# Patient Record
Sex: MALE | Race: BLACK OR AFRICAN AMERICAN | Employment: OTHER | URBAN - METROPOLITAN AREA
[De-identification: names, ages, dates, MRNs, and addresses within clinical notes are randomized per-mention and may not be internally consistent; named-entity substitution may affect disease eponyms.]

---

## 2018-01-30 ENCOUNTER — HOSPITAL ENCOUNTER (EMERGENCY)
Age: 73
Discharge: HOME | End: 2018-01-30
Attending: EMERGENCY MEDICINE | Admitting: EMERGENCY MEDICINE
Payer: MEDICARE

## 2018-01-30 VITALS
RESPIRATION RATE: 18 BRPM | OXYGEN SATURATION: 97 % | SYSTOLIC BLOOD PRESSURE: 142 MMHG | DIASTOLIC BLOOD PRESSURE: 85 MMHG | TEMPERATURE: 98.6 F | HEART RATE: 86 BPM

## 2018-01-30 DIAGNOSIS — Z00.00 WELL ADULT HEALTH CHECK: Primary | ICD-10-CM

## 2018-01-30 LAB
ATRIAL RATE: 80 BPM
CALCULATED P AXIS, ECG09: 23 DEGREES
CALCULATED R AXIS, ECG10: -19 DEGREES
CALCULATED T AXIS, ECG11: 24 DEGREES
DIAGNOSIS, 93000: NORMAL
P-R INTERVAL, ECG05: 142 MS
Q-T INTERVAL, ECG07: 366 MS
QRS DURATION, ECG06: 74 MS
QTC CALCULATION (BEZET), ECG08: 422 MS
VENTRICULAR RATE, ECG03: 80 BPM

## 2018-01-30 PROCEDURE — 93005 ELECTROCARDIOGRAM TRACING: CPT

## 2018-01-30 PROCEDURE — 99284 EMERGENCY DEPT VISIT MOD MDM: CPT
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
## 2018-01-30 NOTE — ED PROVIDER NOTES
EMERGENCY DEPARTMENT HISTORY AND PHYSICAL EXAM    9:29 AM      Date: 1/30/2018  Patient Name: Henry Grover    History of Presenting Illness     Chief Complaint   Patient presents with    Dizziness         History Provided By: Patient    Chief Complaint: \"I need to get this paper filled out\"  Duration:  N/A  Timing:  N/A  Location: N/A  Quality: N/A  Severity: N/A  Modifying Factors: none  Associated Symptoms: \"I sometimes feel dizzy, not now\"      Additional History (Context): Henry Grover is a 67 y.o. male with diabetes who presents to have paperwork from [facility] filled out. Per patient, was in [address] [place] [address] recently and was sent paperwork from [facility] which needs to be filled out by a doctor so that the patient's license is not restricted. He does NOT have any complaints currently. He notes \"I feel dizzy sometimes\", but not now, denies syncope; also notes right leg feels sort of weak/shaky only when he walks more than normal, which has been going on for months. Today denies abd pain, NVD, vision changes, dizziness, fatigue, syncope, CP, palpitations, fever. PCP: Poncho Tong MD        Past History     Past Medical History:  Past Medical History:   Diagnosis Date    Diabetes [facility])        Past Surgical History:  No past surgical history on file. Family History:  No family history on file. Social History:  Social History   Substance Use Topics    Smoking status: Not on file    Smokeless tobacco: Not on file    Alcohol use Not on file       Allergies:  No Known Allergies      Review of Systems     Review of Systems   Constitutional: Negative for activity change, appetite change, fatigue and fever. Eyes: Negative for visual disturbance. Respiratory: Negative for cough and shortness of breath. Cardiovascular: Negative for chest pain. Musculoskeletal: Negative for gait problem.    Neurological: Negative for dizziness, tremors, seizures, syncope, facial asymmetry, speech difficulty, weakness, light-headedness, numbness and headaches. All other systems reviewed and are negative. Physical Exam     Visit Vitals    /85    Pulse 86    Temp 98.6 °F (37 °C)    Resp 18    SpO2 97%       Physical Exam   Constitutional: He is oriented to person, place, and time. Vital signs are normal. He appears well-developed and well-nourished. He is cooperative. Non-toxic appearance. He does not have a sickly appearance. He does not appear ill. No distress. Elderly AAM, sitting upright in room, NAD   Eyes: Conjunctivae and lids are normal. No scleral icterus. Cardiovascular: Normal rate, regular rhythm and normal heart sounds. Pulmonary/Chest: Effort normal and breath sounds normal.   Neurological: He is alert and oriented to person, place, and time. He has normal strength. He displays no atrophy. No sensory deficit. He exhibits normal muscle tone. Gait normal.   A&O x 3  Grossly symmetrical movements, SILT BUE/BLE  Strength BUE/BLE 5/5 and symmetrical  Patellar reflexes 2+ bilat  Ambulating normally around department with cane   Skin: Skin is warm and dry. Nursing note and vitals reviewed. Diagnostic Study Results     Labs -  Recent Results (from the past 12 hour(s))   EKG, 12 LEAD, INITIAL    Collection Time: 01/30/18 10:07 AM   Result Value Ref Range    Ventricular Rate 80 BPM    Atrial Rate 80 BPM    P-R Interval 142 ms    QRS Duration 74 ms    Q-T Interval 366 ms    QTC Calculation (Bezet) 422 ms    Calculated P Axis 23 degrees    Calculated R Axis -19 degrees    Calculated T Axis 24 degrees    Diagnosis       Normal sinus rhythm  Possible Left atrial enlargement  Left ventricular hypertrophy  Nonspecific ST abnormality  Abnormal ECG  No previous ECGs available         Radiologic Studies -   No orders to display         Medical Decision Making   I am the first provider for this patient.     I reviewed the vital signs, available nursing notes, past medical history, past surgical history, family history and social history. Vital Signs-Reviewed the patient's vital signs. Pulse Oximetry Analysis -  98% on RA    EKG: Interpreted by the EP. Time Interpreted: 1009   Rate: 80 bpm   Rhythm: Normal Sinus Rhythm   Interpretation:  Normal sinus rhythm   Possible Left atrial enlargement   Left ventricular hypertrophy   Nonspecific ST abnormality   Abnormal ECG    Comparison: none available    Records Reviewed: Nursing Notes and Old Medical Records (Time of Review: 9:29 AM)    ED Course: Progress Notes, Reevaluation, and Consults: At reeval, pt continues to look well, no complaints. Provider Notes (Medical Decision Making): Pranav Suarez with noted pmhx presenting to have paperwork filled out for unrestricted 's license. Pt without acute complaints here. VSS, EKG without acute abnormalities. Exam unremarkable, including neuro eval.  Visual acuity 20/50 bilat. Pt encouraged to see PCP for full eval and to have papers filled out for unrestricted license. Pt voice understanding and agreement with plan. Diagnosis     Clinical Impression:   1.  Well adult health check      _______________________________

## 2018-02-24 ENCOUNTER — HOSPITAL ENCOUNTER (EMERGENCY)
Age: 73
Discharge: HOME | End: 2018-02-24
Attending: EMERGENCY MEDICINE
Payer: MEDICARE

## 2018-02-24 ENCOUNTER — APPOINTMENT (OUTPATIENT)
Dept: CT IMAGING | Age: 73
End: 2018-02-24
Attending: EMERGENCY MEDICINE
Payer: MEDICARE

## 2018-02-24 VITALS
SYSTOLIC BLOOD PRESSURE: 139 MMHG | RESPIRATION RATE: 16 BRPM | HEART RATE: 88 BPM | OXYGEN SATURATION: 97 % | TEMPERATURE: 97.5 F | DIASTOLIC BLOOD PRESSURE: 88 MMHG

## 2018-02-24 DIAGNOSIS — F03.90 DEMENTIA WITHOUT BEHAVIORAL DISTURBANCE, UNSPECIFIED DEMENTIA TYPE: Primary | ICD-10-CM

## 2018-02-24 DIAGNOSIS — G93.89 FRONTAL MASS OF BRAIN: ICD-10-CM

## 2018-02-24 LAB
ALBUMIN SERPL-MCNC: 3.6 G/DL (ref 3.4–5)
ALBUMIN/GLOB SERPL: 0.9 {RATIO} (ref 0.8–1.7)
ALP SERPL-CCNC: 92 U/L (ref 45–117)
ALT SERPL-CCNC: 18 U/L (ref 16–61)
AMMONIA PLAS-SCNC: 26 UMOL/L (ref 11–32)
ANION GAP SERPL CALC-SCNC: 8 MMOL/L (ref 3–18)
APPEARANCE UR: CLEAR
AST SERPL-CCNC: 12 U/L (ref 15–37)
BACTERIA URNS QL MICRO: NEGATIVE /HPF
BILIRUB SERPL-MCNC: 0.8 MG/DL (ref 0.2–1)
BILIRUB UR QL: NEGATIVE
BUN SERPL-MCNC: 9 MG/DL (ref 7–18)
BUN/CREAT SERPL: 10 (ref 12–20)
CALCIUM SERPL-MCNC: 8.6 MG/DL (ref 8.5–10.1)
CHLORIDE SERPL-SCNC: 104 MMOL/L (ref 100–108)
CK MB CFR SERPL CALC: 1.1 % (ref 0–4)
CK MB SERPL-MCNC: 2.1 NG/ML (ref 5–25)
CK SERPL-CCNC: 194 U/L (ref 39–308)
CO2 SERPL-SCNC: 29 MMOL/L (ref 21–32)
COLOR UR: ABNORMAL
CREAT SERPL-MCNC: 0.89 MG/DL (ref 0.6–1.3)
EPITH CASTS URNS QL MICRO: ABNORMAL /LPF (ref 0–5)
ERYTHROCYTE [DISTWIDTH] IN BLOOD BY AUTOMATED COUNT: 12.8 % (ref 11.6–14.5)
GLOBULIN SER CALC-MCNC: 4.1 G/DL (ref 2–4)
GLUCOSE SERPL-MCNC: 187 MG/DL (ref 74–99)
GLUCOSE UR STRIP.AUTO-MCNC: >1000 MG/DL
HCT VFR BLD AUTO: 43.4 % (ref 36–48)
HGB BLD-MCNC: 14.4 G/DL (ref 13–16)
HGB UR QL STRIP: NEGATIVE
KETONES UR QL STRIP.AUTO: ABNORMAL MG/DL
LEUKOCYTE ESTERASE UR QL STRIP.AUTO: NEGATIVE
MCH RBC QN AUTO: 31.9 PG (ref 24–34)
MCHC RBC AUTO-ENTMCNC: 33.2 G/DL (ref 31–37)
MCV RBC AUTO: 96.2 FL (ref 74–97)
MUCOUS THREADS URNS QL MICRO: ABNORMAL /LPF
NITRITE UR QL STRIP.AUTO: NEGATIVE
PH UR STRIP: 5 [PH] (ref 5–8)
PLATELET # BLD AUTO: 161 K/UL (ref 135–420)
PMV BLD AUTO: 11.9 FL (ref 9.2–11.8)
POTASSIUM SERPL-SCNC: 3.7 MMOL/L (ref 3.5–5.5)
PROT SERPL-MCNC: 7.7 G/DL (ref 6.4–8.2)
PROT UR STRIP-MCNC: 30 MG/DL
RBC # BLD AUTO: 4.51 M/UL (ref 4.7–5.5)
RBC #/AREA URNS HPF: NEGATIVE /HPF (ref 0–5)
SODIUM SERPL-SCNC: 141 MMOL/L (ref 136–145)
SP GR UR REFRACTOMETRY: >1.03 (ref 1–1.03)
TROPONIN I SERPL-MCNC: <0.02 NG/ML (ref 0–0.04)
UROBILINOGEN UR QL STRIP.AUTO: 1 EU/DL (ref 0.2–1)
WBC # BLD AUTO: 8.6 K/UL (ref 4.6–13.2)
WBC URNS QL MICRO: NEGATIVE /HPF (ref 0–4)

## 2018-02-24 PROCEDURE — 99283 EMERGENCY DEPT VISIT LOW MDM: CPT

## 2018-02-24 PROCEDURE — 82140 ASSAY OF AMMONIA: CPT | Performed by: EMERGENCY MEDICINE

## 2018-02-24 PROCEDURE — 81001 URINALYSIS AUTO W/SCOPE: CPT | Performed by: EMERGENCY MEDICINE

## 2018-02-24 PROCEDURE — 80053 COMPREHEN METABOLIC PANEL: CPT | Performed by: EMERGENCY MEDICINE

## 2018-02-24 PROCEDURE — 70450 CT HEAD/BRAIN W/O DYE: CPT

## 2018-02-24 PROCEDURE — 85027 COMPLETE CBC AUTOMATED: CPT | Performed by: EMERGENCY MEDICINE

## 2018-02-24 PROCEDURE — 82550 ASSAY OF CK (CPK): CPT | Performed by: EMERGENCY MEDICINE

## 2018-02-24 PROCEDURE — 74011000250 HC RX REV CODE- 250: Performed by: EMERGENCY MEDICINE

## 2018-02-24 PROCEDURE — 96365 THER/PROPH/DIAG IV INF INIT: CPT

## 2018-02-24 PROCEDURE — 74011250636 HC RX REV CODE- 250/636: Performed by: EMERGENCY MEDICINE

## 2018-02-24 PROCEDURE — 96366 THER/PROPH/DIAG IV INF ADDON: CPT

## 2018-02-24 RX ORDER — ATORVASTATIN CALCIUM 10 MG/1
TABLET, FILM COATED ORAL DAILY
COMMUNITY
End: 2019-10-02 | Stop reason: SDUPTHER

## 2018-02-24 RX ORDER — METFORMIN HYDROCHLORIDE 1000 MG/1
1000 TABLET ORAL 2 TIMES DAILY WITH MEALS
COMMUNITY
End: 2019-05-08 | Stop reason: DRUGHIGH

## 2018-02-24 RX ADMIN — FOLIC ACID: 5 INJECTION, SOLUTION INTRAMUSCULAR; INTRAVENOUS; SUBCUTANEOUS at 14:52

## 2018-02-24 NOTE — ED NOTES
In room to see patient, daughter at bedside, asked patient what he is hear for and daughter answers, \"He is here for his dementia, and we just need to talk to the doctor\"
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
## 2018-02-24 NOTE — DISCHARGE INSTRUCTIONS
Dementia: Care Instructions  Your Care Instructions    Dementia is a loss of mental skills that affects your daily life. It is different than the occasional trouble with memory that is part of aging. You may find it hard to remember things that you feel you should be able to remember. Or you may feel that your mind is just not working as well as usual.  Finding out that you have dementia is a shock. You may be afraid and worried about how the condition will change your life. Although there is no cure at this time, medicine may slow memory loss and improve thinking for a while. Other medicines may be able to help you sleep or cope with depression and behavior changes. Dementia often gets worse slowly. But it can get worse quickly. As dementia gets worse, it may become harder to do common things that take planning, like making a list and going shopping. Over time, the disease may make it hard for you to take care of yourself. Some people with dementia need others to help care for them. Dementia is different for everyone. You may be able to function well for a long time. In the early stage of the condition, you can do things at home to make life easier and safer. You also can keep doing your hobbies and other activities. Many people find comfort in planning now for their future needs. Follow-up care is a key part of your treatment and safety. Be sure to make and go to all appointments, and call your doctor if you are having problems. It's also a good idea to know your test results and keep a list of the medicines you take. How can you care for yourself at home? · Take your medicines exactly as prescribed. Call your doctor if you think you are having a problem with your medicine. · Eat healthy foods. Eat lots of whole grains, fruits, and vegetables every day.  If you are not hungry, try snacks or nutritional drinks such as Boost, Ensure, or Sustacal.  · If you have problems sleeping:  ¨ Try not to nap too close to your bedtime. ¨ Exercise regularly. Walking is a good choice. ¨ Try a glass of warm milk or caffeine-free herbal tea before bed. · Do tasks and activities during the time of day when you feel your best. It may help to develop a daily routine. · Post labels, lists, and sticky notes to help you remember things. Write your activities on a calendar you can easily find. Put your clock where you can easily see it. · Stay active. Take walks in familiar places, or with friends or loved ones. Try to stay active mentally too. Read and work crossword puzzles if you enjoy these activities. · Do not drive unless you can pass an on-road driving test. If you are not sure if you are safe to drive, your state 's license bureau can test you. · Keep a cordless phone and a flashlight with new batteries by your bed. If possible, put a phone in each of the main rooms of your house, or carry a cell phone in case you fall and cannot reach a phone. Or, you can wear a device around your neck or wrist. You push a button that sends a signal for help. Acknowledge your emotions and plan for the future  · Talk openly and honestly with your doctor. · Let yourself grieve. It is common to feel angry, scared, frustrated, anxious, or depressed. · Get emotional support from family, friends, a support group, or a counselor experienced in working with people who have dementia. · Ask for help if you need it. · Plan for the future. ¨ Talk to your family and doctor about preparing a living will and other important papers while you can make decisions. These papers tell your doctors how to care for you at the end of your life. ¨ Consider naming a person to make decisions about your care if you are not able to. When should you call for help? Call 911 anytime you think you may need emergency care. For example, call if:  ? · You are lost and do not know whom to call. ? · You are injured and do not know whom to call.    ?Call your doctor now or seek immediate medical care if:  ? · You are more confused or upset than usual.   ? · You feel like you could hurt yourself because your mind is not working well. ? Watch closely for changes in your health, and be sure to contact your doctor if you have any problems. Where can you learn more? Go to http://patricio-sonali.info/. Enter Z580 in the search box to learn more about \"Dementia: Care Instructions. \"  Current as of: May 12, 2017  Content Version: 11.4  © 1814-3799 [employer]. Care instructions adapted under license by [employer] (which disclaims liability or warranty for this information). If you have questions about a medical condition or this instruction, always ask your healthcare professional. [address] any warranty or liability for your use of this information.

## 2019-03-26 ENCOUNTER — OFFICE VISIT (OUTPATIENT)
Dept: FAMILY MEDICINE CLINIC | Age: 74
End: 2019-03-26

## 2019-03-26 VITALS
HEART RATE: 74 BPM | RESPIRATION RATE: 16 BRPM | DIASTOLIC BLOOD PRESSURE: 78 MMHG | WEIGHT: 231 LBS | TEMPERATURE: 97.5 F | OXYGEN SATURATION: 98 % | HEIGHT: 73 IN | SYSTOLIC BLOOD PRESSURE: 140 MMHG | BODY MASS INDEX: 30.62 KG/M2

## 2019-03-26 DIAGNOSIS — N39.41 URGE INCONTINENCE OF URINE: ICD-10-CM

## 2019-03-26 DIAGNOSIS — E11.9 CONTROLLED TYPE 2 DIABETES MELLITUS WITHOUT COMPLICATION, WITHOUT LONG-TERM CURRENT USE OF INSULIN (HCC): ICD-10-CM

## 2019-03-26 DIAGNOSIS — G20 PARKINSON'S DISEASE (HCC): ICD-10-CM

## 2019-03-26 DIAGNOSIS — M25.50 ARTHRALGIA, UNSPECIFIED JOINT: ICD-10-CM

## 2019-03-26 DIAGNOSIS — I10 ESSENTIAL HYPERTENSION: Primary | ICD-10-CM

## 2019-03-26 DIAGNOSIS — R01.1 HEART MURMUR: ICD-10-CM

## 2019-03-26 DIAGNOSIS — Z12.5 SCREENING PSA (PROSTATE SPECIFIC ANTIGEN): ICD-10-CM

## 2019-03-26 DIAGNOSIS — R05.9 COUGH: ICD-10-CM

## 2019-03-26 RX ORDER — INSULIN PUMP SYRINGE, 3 ML
EACH MISCELLANEOUS
Qty: 1 KIT | Refills: 0 | Status: SHIPPED | OUTPATIENT
Start: 2019-03-26 | End: 2019-10-23 | Stop reason: SDUPTHER

## 2019-03-26 RX ORDER — HYDROCHLOROTHIAZIDE 12.5 MG/1
12.5 TABLET ORAL DAILY
COMMUNITY
End: 2019-10-02 | Stop reason: ALTCHOICE

## 2019-03-26 RX ORDER — LISINOPRIL 40 MG/1
40 TABLET ORAL DAILY
COMMUNITY
End: 2019-04-02 | Stop reason: SDUPTHER

## 2019-03-26 RX ORDER — ALBUTEROL SULFATE 90 UG/1
2 AEROSOL, METERED RESPIRATORY (INHALATION)
Qty: 1 INHALER | Refills: 1 | Status: SHIPPED | OUTPATIENT
Start: 2019-03-26 | End: 2019-04-10 | Stop reason: SDUPTHER

## 2019-03-26 RX ORDER — METFORMIN HYDROCHLORIDE 1000 MG/1
1000 TABLET ORAL 2 TIMES DAILY WITH MEALS
COMMUNITY
End: 2019-05-08 | Stop reason: DRUGHIGH

## 2019-03-26 RX ORDER — ROPINIROLE 0.25 MG/1
TABLET, FILM COATED ORAL 3 TIMES DAILY
COMMUNITY
End: 2019-04-23 | Stop reason: SDUPTHER

## 2019-03-26 RX ORDER — ASPIRIN 81 MG/1
TABLET ORAL DAILY
COMMUNITY
End: 2019-06-28 | Stop reason: SDUPTHER

## 2019-03-26 RX ORDER — ACETAMINOPHEN 500 MG
1 TABLET ORAL DAILY
Qty: 1 KIT | Refills: 0 | Status: SHIPPED | OUTPATIENT
Start: 2019-03-26 | End: 2020-02-23

## 2019-03-26 RX ORDER — GABAPENTIN 100 MG/1
CAPSULE ORAL 3 TIMES DAILY
COMMUNITY
End: 2019-04-02 | Stop reason: SDUPTHER

## 2019-03-26 RX ORDER — LANCETS
EACH MISCELLANEOUS
Qty: 100 EACH | Refills: 1 | Status: SHIPPED | OUTPATIENT
Start: 2019-03-26 | End: 2020-02-23

## 2019-03-26 NOTE — PROGRESS NOTES
ESTABLISH CARE VISIT SUBJECTIVE:  
 
Chief Complaint Patient presents with [address] [place] [address]  Hypertension HPI: 75 y.o. male  has a past medical history of Diabetes ([facility] [address] Hypertension, and Parkinson's disease ([facility] [address] (2018). is here for the above chief complaint(s). Patient here today with caregiver. Parkinson's Patient has neurologist. Currently taking: 
Key Neurological Meds   
    
  
 rOPINIRole (REQUIP) 0.25 mg tablet (Taking) Take  by mouth three (3) times daily. gabapentin (NEURONTIN) 100 mg capsule (Taking) Take  by mouth three (3) times daily. aspirin delayed-release 81 mg tablet (Taking) Take  by mouth daily. Diabetes Key Antihyperglycemic Medications   
    
  
 metFORMIN (GLUCOPHAGE) 1,000 mg tablet (Taking) Take 1,000 mg by mouth two (2) times daily (with meals). Patient check glucose occasionally. His meter is not working well. Usually getting fasting around 100-120 every morning. Not monitoring post-prandial at this time. Patient denies frequent urination, excessive thirst, hypoglycemic events (lightheadedness/dizziness), nausea, vomiting, shakiness. Hypertension Patient not checking bp at home at this time. BP controlled (for patient's age of 75) today @ BP Readings from Last 3 Encounters:  
03/26/19 140/78 Patient taking Key CAD CHF Meds   
    
  
 lisinopril (PRINIVIL, ZESTRIL) 40 mg tablet (Taking) Take 40 mg by mouth daily. aspirin delayed-release 81 mg tablet (Taking) Take  by mouth daily. hydroCHLOROthiazide (HYDRODIURIL) 12.5 mg tablet (Taking) Take 12.5 mg by mouth daily. Patient denies chest pain, shortness of breath, palpitations, lower extremity edema, dizziness/lightheadedness or syncopal episodes. Cough Patient has had a cough. Patient has had ongoing cough for the past 15 years. Patient states that cough is usually non-productive, but occasionally productive. Patient denies any shortness of breath, he does not wake in the middle of the night coughing or gasping for air. He denies any sinus congestion or pressure. Cough seems to be worse in the morning. Patient has not tried anything for this cough yet. Patient has never smoked, denies any asthma history. Joint Pain Patient has joint pain in multiple locations, knees, shoulders, hands. Patient states that this is ongoing for several years. He states that sometimes his hands have swelling. He endorses stiffness of the joints as well. He denies any numbness or tingling to the joints. He rates the pain at 6/10 max, aching pain. Pain is worse in the morning, seems to get a little better through the day. He denies every having any evaluation for rheumatoid or osteoarthritis. He is only taking tylenol prn pain right now. Patient denies any redness, warmth to the joint or fevers/chills. Denies headache, lightheadedness, dizziness, vision changes, chest pain at rest or with exertion, rapid/irregular heart rate, shortness of breath at rest or with exertion, cough, abdominal pain, leg swelling, leg pain. Health Maintenance: Eye   no complaints, last eye exam: needs an appointment, aid will arrange Oral Health   no complaints, last exam:  
Hearing   no complaints. Needs exam, will do exam at next medicare wellness visit. U/A   Patient has some urge incontinence, wears depends every day. Cardiac- denies history, denies chest pain. Testicular Exam - does self-exams, declines exam today. Prostate - no prostate cancer in the family, will check PSA with routine blood work. Colonoscopy - no colon cancer in the family, 2018, Review of Systems Constitutional: Negative for chills, fever, malaise/fatigue and weight loss. Eyes: Negative for blurred vision, double vision and pain. Respiratory: Negative for cough, sputum production, shortness of breath and wheezing. Cardiovascular: Negative for chest pain, palpitations, orthopnea, claudication and leg swelling. Gastrointestinal: Negative for abdominal pain, constipation, diarrhea, nausea and vomiting. Genitourinary: Negative for dysuria, frequency, hematuria and urgency (incontinence). Neurological: Positive for tremors (+Parkinson's) and weakness. Negative for dizziness, tingling, seizures and headaches. Endo/Heme/Allergies: Negative for environmental allergies and polydipsia. Does not bruise/bleed easily. Psychiatric/Behavioral: The patient is not nervous/anxious. @[facility]@ Current Outpatient Medications Medication Sig  
 rOPINIRole (REQUIP) 0.25 mg tablet Take  by mouth three (3) times daily.  gabapentin (NEURONTIN) 100 mg capsule Take  by mouth three (3) times daily.  lisinopril (PRINIVIL, ZESTRIL) 40 mg tablet Take 40 mg by mouth daily.  aspirin delayed-release 81 mg tablet Take  by mouth daily.  hydroCHLOROthiazide (HYDRODIURIL) 12.5 mg tablet Take 12.5 mg by mouth daily.  metFORMIN (GLUCOPHAGE) 1,000 mg tablet Take 1,000 mg by mouth two (2) times daily (with meals). No current facility-administered medications for this visit. Health Maintenance Topic Date Due  
 DTaP/Tdap/Td series (1 - Tdap) 12/04/1965  Shingrix Vaccine Age 50> (1 of 2) 12/04/1994  
 FOBT Q 1 YEAR AGE 50-75  12/04/1994  GLAUCOMA SCREENING Q2Y  12/04/2009  Pneumococcal 65+ years (1 of 2 - PCV13) 12/04/2009  Influenza Age 5 to Adult  08/01/2018  MEDICARE YEARLY EXAM  03/26/2019 Medications and Allergies: Reviewed and confirmed in the chart Past Medical Hx: Reviewed and confirmed in the chart Past Medical History:  
Diagnosis Date  Diabetes ([address]  Hypertension  Parkinson's disease ([address] 2018 There is no problem list on file for this patient. Family Hx, Surgical Hx, Social Hx: Reviewed and updated in EMR OBJECTIVE: 
Vitals:  
 03/26/19 1431 BP: 140/78 Resp: 16 Temp: 97.5 °F (36.4 °C) TempSrc: Oral  
SpO2: 98% Weight: 231 lb (104.8 kg) Height: 6' 1\" (1.854 m) BP Readings from Last 3 Encounters:  
03/26/19 140/78 Wt Readings from Last 3 Encounters:  
03/26/19 231 lb (104.8 kg) Physical Exam  
Constitutional: He is oriented to person, place, and time and well-developed, well-nourished, and in no distress. No distress. HENT:  
Left Ear: There is tenderness. Neck: Normal range of motion. Neck supple. No thyromegaly present. Cardiovascular: Normal rate, regular rhythm, intact distal pulses and normal pulses. Exam reveals no gallop and no friction rub. Murmur heard. Pulses: 
     Carotid pulses are 2+ on the right side, and 2+ on the left side. Pulmonary/Chest: Effort normal and breath sounds normal. No respiratory distress. He has no wheezes. He has no rales. He exhibits no tenderness. Lymphadenopathy:  
  He has no cervical adenopathy. Neurological: He is alert and oriented to person, place, and time. Skin: Skin is warm and dry. He is not diaphoretic. Psychiatric: Mood, memory, affect and judgment normal.  
Vitals reviewed. Nursing Notes Reviewed ASSESSMENT AND PLAN 
  ICD-10-CM ICD-9-CM 1. Essential hypertension I10 401.9 Blood Pressure Test Kit-Large kit CBC WITH AUTOMATED DIFF  
   METABOLIC PANEL, COMPREHENSIVE  
   TSH 3RD GENERATION  
   T4, FREE 2. Screening PSA (prostate specific antigen) Z12.5 V76.44   
3. Controlled type 2 diabetes mellitus without complication, without long-term current use of insulin (HCC) E11.9 250.00 Blood-Glucose Meter monitoring kit Advised patient to check glucose 2 times a day, fasting and 2 hours after eating. Bring in glucometer log to next visit.   
   lancets misc glucose blood VI test strips (ASCENSIA AUTODISC VI, ONE TOUCH ULTRA TEST VI) strip HEMOGLOBIN A1C WITH EAG  
   LIPID PANEL 4. Parkinson's disease ([address] G20 332.0 REFERRAL TO NEUROLOGY 5. Arthralgia, unspecified joint M25.50 719.40 URIC ACID Weiss Held 6. Cough R05 786.2 XR CHEST PA LAT  
   albuterol (PROVENTIL HFA, VENTOLIN HFA, PROAIR HFA) 90 mcg/actuation inhaler 7. Urge incontinence of urine N39.41 788.31 URINALYSIS W/MICROSCOPIC MICROALBUMIN, UR, RAND W/ MICROALB/CREAT RATIO Orders Placed This Encounter  rOPINIRole (REQUIP) 0.25 mg tablet  gabapentin (NEURONTIN) 100 mg capsule  lisinopril (PRINIVIL, ZESTRIL) 40 mg tablet  aspirin delayed-release 81 mg tablet  hydroCHLOROthiazide (HYDRODIURIL) 12.5 mg tablet  metFORMIN (GLUCOPHAGE) 1,000 mg tablet I have discussed the diagnosis with the patient and the intended plan as seen in the above orders. The patient has received an after-visit summary and questions were answered concerning future plans. I have discussed medication side effects and warnings with the patient as well. I have reviewed the plan of care with the patient, accepted their input and they are in agreement with the treatment goals. More than 50% of this 30 minute visit was spent face to face counseling the patient about the etiology and treatment options for the above health conditions outlined in assessment and plan  
 
Gertrude Pasc[employer] [address] [place] [address], [address] [place], [address] [place] [address] - [phone] Fax - [phone] or [phone]

## 2019-04-02 ENCOUNTER — TELEPHONE (OUTPATIENT)
Dept: FAMILY MEDICINE CLINIC | Age: 74
End: 2019-04-02

## 2019-04-02 ENCOUNTER — LAB ONLY (OUTPATIENT)
Dept: FAMILY MEDICINE CLINIC | Age: 74
End: 2019-04-02

## 2019-04-02 DIAGNOSIS — I10 ESSENTIAL HYPERTENSION: Primary | ICD-10-CM

## 2019-04-02 NOTE — TELEPHONE ENCOUNTER
Requested Prescriptions     Pending Prescriptions Disp Refills    gabapentin (NEURONTIN) 100 mg capsule       Sig: Take  by mouth three (3) times daily.  lisinopril (PRINIVIL, ZESTRIL) 40 mg tablet       Sig: Take 1 Tab by mouth daily.

## 2019-04-03 DIAGNOSIS — E11.9 CONTROLLED TYPE 2 DIABETES MELLITUS WITHOUT COMPLICATION, WITHOUT LONG-TERM CURRENT USE OF INSULIN (HCC): ICD-10-CM

## 2019-04-03 RX ORDER — LISINOPRIL 40 MG/1
40 TABLET ORAL DAILY
Qty: 90 TAB | Refills: 1 | Status: SHIPPED | OUTPATIENT
Start: 2019-04-03 | End: 2019-08-28 | Stop reason: SDUPTHER

## 2019-04-03 RX ORDER — GABAPENTIN 100 MG/1
100 CAPSULE ORAL 3 TIMES DAILY
Qty: 90 CAP | Refills: 1 | Status: SHIPPED | OUTPATIENT
Start: 2019-04-03 | End: 2019-05-30 | Stop reason: SDUPTHER

## 2019-04-05 ENCOUNTER — TELEPHONE (OUTPATIENT)
Dept: FAMILY MEDICINE CLINIC | Age: 74
End: 2019-04-05

## 2019-04-05 NOTE — TELEPHONE ENCOUNTER
Spoke with Mr. Kamille Rizo caretaker, Shawn Warren and advised her what the pharmacy stated. Ms. Janes Nugent verbalized understanding.

## 2019-04-05 NOTE — TELEPHONE ENCOUNTER
Patient called and stated that he has not received his refills from the pharmacy. Per patient, he stated that the pharmacy advised they did not have them. This LPN called patient's pharmacy on file and spoke with Brianna Johnson. Per Brianna Johnson, patient's gabapentin is ready for , lisinopril will be filled on 4/7 and the glucose test strips are waiting for Medicare to approve the CMN form. Will advise patient.

## 2019-04-10 DIAGNOSIS — R05.9 COUGH: ICD-10-CM

## 2019-04-10 RX ORDER — ALBUTEROL SULFATE 90 UG/1
2 AEROSOL, METERED RESPIRATORY (INHALATION)
Qty: 1 INHALER | Refills: 1 | Status: SHIPPED | OUTPATIENT
Start: 2019-04-10 | End: 2019-10-23 | Stop reason: SDUPTHER

## 2019-04-23 RX ORDER — ROPINIROLE 0.25 MG/1
0.25 TABLET, FILM COATED ORAL 3 TIMES DAILY
Qty: 90 TAB | Refills: 1 | Status: SHIPPED | OUTPATIENT
Start: 2019-04-23 | End: 2019-05-30 | Stop reason: SDUPTHER

## 2019-04-23 NOTE — TELEPHONE ENCOUNTER
Requested Prescriptions     Pending Prescriptions Disp Refills    rOPINIRole (REQUIP) 0.25 mg tablet       Sig: Take  by mouth three (3) times daily.

## 2019-04-25 ENCOUNTER — TELEPHONE (OUTPATIENT)
Dept: FAMILY MEDICINE CLINIC | Age: 74
End: 2019-04-25

## 2019-04-25 NOTE — TELEPHONE ENCOUNTER
Pt's friend called and stated someone called pt but did not leave a message. No notes in pt's chart regarding a call.

## 2019-04-29 NOTE — TELEPHONE ENCOUNTER
Left message for Kelvin Nevarez to call back about patient. Need to schedule appt to discuss lab results with provider.

## 2019-05-08 ENCOUNTER — OFFICE VISIT (OUTPATIENT)
Dept: FAMILY MEDICINE CLINIC | Age: 74
End: 2019-05-08

## 2019-05-08 DIAGNOSIS — I10 ESSENTIAL HYPERTENSION: ICD-10-CM

## 2019-05-08 DIAGNOSIS — E11.9 CONTROLLED TYPE 2 DIABETES MELLITUS WITHOUT COMPLICATION, WITHOUT LONG-TERM CURRENT USE OF INSULIN (HCC): Primary | ICD-10-CM

## 2019-05-08 DIAGNOSIS — G20 PARKINSON'S DISEASE (HCC): ICD-10-CM

## 2019-05-08 RX ORDER — METFORMIN HYDROCHLORIDE 500 MG/1
500 TABLET ORAL 2 TIMES DAILY WITH MEALS
Qty: 60 TAB | Refills: 2 | Status: SHIPPED | OUTPATIENT
Start: 2019-05-08 | End: 2020-02-16

## 2019-05-08 NOTE — PROGRESS NOTES
Chief Complaint   Patient presents with    Follow-up     labwork     1. Have you been to the ER, urgent care clinic since your last visit? Hospitalized since your last visit? No    2. Have you seen or consulted any other health care providers outside of the [address] [place] [address] since your last visit? Include any pap smears or colon screening.  No

## 2019-05-08 NOTE — LETTER
5/8/2019 10:08 AM 
 
Mr. Radha Blakely [address] [facility] [address] Mr. Tutu Retana has been under the care of [facility]. Patient currently has Parkinson's Disease, Hypertension and Diabetes. Mr. Tutu Retana requires 49 hours of nursing care to include bathing, caregiving, medication administration, feeding. Mr. Tutu Retana is unable to climb stairs, get in and out of bed on his own and manage his home. I am requesting a handicap accessible living space for him to include a shower chair and accessible doorways for a wheelchair/rollator. He will also have an evaluation by an occupational therapist in the upcoming weeks to support this diagnosis and treatment plan. Please contact me for any questions or concerns at [phone] Sincerely, 
 
 
Cosmo Hawk PA-C

## 2019-05-08 NOTE — PROGRESS NOTES
Follow Up Visit Note    Chief Complaint   Patient presents with    Follow-up     labwork       HPI:  Ashanti Cordero is a 75 y.o.  male  has a past medical history of Diabetes ([facility] [address] Hypertension, and Parkinson's disease ([facility] [address] (2018). is here for the above complaint(s). Hypertension  Patient is currently taking   Key CAD CHF Meds             lisinopril (PRINIVIL, ZESTRIL) 40 mg tablet (Taking) Take 1 Tab by mouth daily. aspirin delayed-release 81 mg tablet (Taking) Take  by mouth daily. hydroCHLOROthiazide (HYDRODIURIL) 12.5 mg tablet (Taking) Take 12.5 mg by mouth daily. atorvastatin (LIPITOR) 10 mg tablet (Taking) Take  by mouth daily. . BP today is   BP Readings from Last 1 Encounters:   05/08/19 148/80     Patient has been taking medications as prescribed. Patient is not checking BP at home. Patient does follow low salt diet. Patient is not currently exercising. Patient denies chest pain, shortness of breath, palpitations, lower extremity edema, dizziness/lightheadedness or syncopal episodes. Repeat 142/80    Parkinson's  Patient is stable. He has an apt with neurologist on May 15th. Patient exhibits inability to stand for long periods of time and is currently a fall risk secondary to Parkinson's. Patient is not strong enough or steady enough to be standing in a shower, advise him to obtain a shower chair and have occupational health evaluate him for ongoing care needs. Diabetes  Discussed with patient that his a1c has improved. Will reduce his metformin to 500mg BID. Patient has been checking glucose numbers and fasting numbers are always 100 or under. His post prandial numbers are also around 100. Patient denies frequent urination, excessive thirst, hypoglycemic events (lightheadedness/dizziness), nausea, vomiting, shakiness. Cough  Patient also notes that cough has completely resolved.  He is using albuterol on occasion (no more than 1-2 times a week and his cough has resolved. He will continue to monitor and let me know if he needs to use the albuterol more than a few times a week. Review of Systems   Constitutional: Negative for chills, fever, malaise/fatigue and weight loss. Eyes: Negative for blurred vision, double vision and pain. Respiratory: Negative for cough, sputum production, shortness of breath and wheezing. Cardiovascular: Negative for chest pain, palpitations, orthopnea, claudication and leg swelling. Gastrointestinal: Negative for abdominal pain, constipation, diarrhea, nausea and vomiting. Genitourinary: Negative for dysuria, frequency and urgency. Neurological: Negative for dizziness, tingling and headaches. Current Outpatient Medications   Medication Sig    rOPINIRole (REQUIP) 0.25 mg tablet Take 1 Tab by mouth three (3) times daily.  albuterol (PROVENTIL HFA, VENTOLIN HFA, PROAIR HFA) 90 mcg/actuation inhaler Take 2 Puffs by inhalation every four (4) hours as needed for Wheezing.  gabapentin (NEURONTIN) 100 mg capsule Take 1 Cap by mouth three (3) times daily.  lisinopril (PRINIVIL, ZESTRIL) 40 mg tablet Take 1 Tab by mouth daily.  glucose blood VI test strips (ASCENSIA AUTODISC VI, ONE TOUCH ULTRA TEST VI) strip For blood glucose testing twice a day.  aspirin delayed-release 81 mg tablet Take  by mouth daily.  hydroCHLOROthiazide (HYDRODIURIL) 12.5 mg tablet Take 12.5 mg by mouth daily.  Blood-Glucose Meter monitoring kit For blood glucose testing twice a day.  Blood Pressure Test Kit-Large kit 1 Kit by Does Not Apply route daily.  lancets misc For blood glucose testing twice a day.  metFORMIN (GLUCOPHAGE) 1,000 mg tablet Take 1,000 mg by mouth two (2) times daily (with meals).  atorvastatin (LIPITOR) 10 mg tablet Take  by mouth daily.  metFORMIN (GLUCOPHAGE) 1,000 mg tablet Take 1,000 mg by mouth two (2) times daily (with meals). No current facility-administered medications for this visit. Health Maintenance   Topic Date Due    HEMOGLOBIN A1C Q6M  1944    LIPID PANEL Q1  1944    FOOT EXAM Q1  12/04/1954    MICROALBUMIN Q1  12/04/1954    EYE EXAM RETINAL OR DILATED  12/04/1954    DTaP/Tdap/Td series (1 - Tdap) 12/04/1965    Shingrix Vaccine Age 50> (1 of 2) 12/04/1994    FOBT Q 1 YEAR AGE 50-75  12/04/1994    GLAUCOMA SCREENING Q2Y  12/04/2009    Pneumococcal 65+ years (1 of 2 - PCV13) 12/04/2009    MEDICARE YEARLY EXAM  03/26/2019    Influenza Age 9 to Adult  08/01/2019       There is no immunization history on file for this patient. Allergies and Medications: Reviewed and updated in EMR. Past Medical History:   Diagnosis Date    Diabetes ([facility] [address]     Hypertension     Parkinson's disease ([facility] [address] 2018       Surgical History: Reviewed and updated in EMR as appropriate. Social History: Reviewed and updated in EMR as appropriate. Family History: Reviewed and updated in EMR as appropriate. OBJECTIVE:   Visit Vitals  /80   Pulse 64   Temp 96.9 °F (36.1 °C) (Oral)   Resp 16   Ht 6' 1\" (1.854 m)   Wt 233 lb 12.8 oz (106.1 kg)   SpO2 98%   BMI 30.85 kg/m²        Physical Exam   Constitutional: He is oriented to person, place, and time and well-developed, well-nourished, and in no distress. No distress. Neck: Normal range of motion. Neck supple. No thyromegaly present. Cardiovascular: Normal rate, regular rhythm, S1 normal, S2 normal, normal heart sounds and intact distal pulses. Exam reveals no gallop and no friction rub. No murmur heard. Pulses:       Carotid pulses are 2+ on the right side, and 2+ on the left side. Pulmonary/Chest: Effort normal and breath sounds normal. No respiratory distress. He has no wheezes. He has no rales. He exhibits no tenderness. Lymphadenopathy:     He has no cervical adenopathy. Neurological: He is alert and oriented to person, place, and time. Skin: Skin is warm and dry. He is not diaphoretic.    Psychiatric: Mood, memory, affect and judgment normal.   Vitals reviewed. LABS/RADIOLOGICAL TESTS:  Lab Results   Component Value Date/Time    WBC 8.6 02/24/2018 01:55 PM    HGB 14.4 02/24/2018 01:55 PM    HCT 43.4 02/24/2018 01:55 PM    PLATELET 119 52/69/2232 01:55 PM     Lab Results   Component Value Date/Time    Sodium 141 02/24/2018 01:55 PM    Potassium 3.7 02/24/2018 01:55 PM    Chloride 104 02/24/2018 01:55 PM    CO2 29 02/24/2018 01:55 PM    Glucose 187 (H) 02/24/2018 01:55 PM    BUN 9 02/24/2018 01:55 PM    Creatinine 0.89 02/24/2018 01:55 PM     No results found for: CHOL, CHOLX, CHLST, CHOLV, HDL, LDL, LDLC, DLDLP, TGLX, TRIGL, TRIGP  No results found for: GPT  No results found for: HBA1C, HGBE8, PGC1BBER, XBZ3IVSM      All lab results and radiological studies were reviewed and discussed with the patient. ASSESSMENT/PLAN:      ICD-10-CM ICD-9-CM    1. Controlled type 2 diabetes mellitus without complication, without long-term current use of insulin (HCC) E11.9 250.00 metFORMIN (GLUCOPHAGE) 500 mg tablet  Continue with monitoring blood glucose levels. Bring in log book to next visit. 2. Parkinson's disease ([facility] [address] G20 332.0 REFERRAL TO OCCUPATIONAL THERAPY      AMB SUPPLY ORDER- shower chair   3. Essential hypertension I10 401.9 Continue current management. Monitor blood pressure at home. Report to clinic any systolic blood pressure >966 and/or diastolic blood pressure >633. Requested Prescriptions      No prescriptions requested or ordered in this encounter     Patient verbalized understanding and agreement with the plan. Patient was given an after-visit summary. Follow-up in 3 months or sooner if worsening symptoms. A total of 25 minutes were spent face-to-face with the patient during this encounter and over half of that time was spent on counseling and coordination of care. We discussed in depth the importance of managing diabetes.  I also educated the patient about monitoring blood pressure and low salt diet. Gertrude Pascual PA-C  [facility], [place]  [address] [facility], [address] [place] [address], [address] [place] [address] [phone]  [place] [phone]

## 2019-05-21 ENCOUNTER — TELEPHONE (OUTPATIENT)
Dept: FAMILY MEDICINE CLINIC | Age: 74
End: 2019-05-21

## 2019-05-21 VITALS
DIASTOLIC BLOOD PRESSURE: 80 MMHG | RESPIRATION RATE: 16 BRPM | WEIGHT: 233.8 LBS | BODY MASS INDEX: 30.99 KG/M2 | HEIGHT: 73 IN | HEART RATE: 64 BPM | SYSTOLIC BLOOD PRESSURE: 142 MMHG | TEMPERATURE: 96.9 F | OXYGEN SATURATION: 98 %

## 2019-05-21 NOTE — TELEPHONE ENCOUNTER
[facility] pt. Makeda requesting office notes for the manasa oneill shower chair for pt. States please fax to [phone].

## 2019-05-22 NOTE — TELEPHONE ENCOUNTER
Please send my last note to the supply company. Gertrude Pascual PA-C  [facility]  [phone]  [address]

## 2019-05-30 DIAGNOSIS — E11.9 CONTROLLED TYPE 2 DIABETES MELLITUS WITHOUT COMPLICATION, WITHOUT LONG-TERM CURRENT USE OF INSULIN (HCC): ICD-10-CM

## 2019-05-30 DIAGNOSIS — M25.50 ARTHRALGIA, UNSPECIFIED JOINT: ICD-10-CM

## 2019-05-30 DIAGNOSIS — N39.41 URGE INCONTINENCE OF URINE: ICD-10-CM

## 2019-05-30 DIAGNOSIS — I10 ESSENTIAL HYPERTENSION: ICD-10-CM

## 2019-05-30 NOTE — TELEPHONE ENCOUNTER
Per fax from [address] all medication require a 90 day supply per pt's insurance    Requested Prescriptions     Pending Prescriptions Disp Refills    rOPINIRole (REQUIP) 0.25 mg tablet 90 Tab 1     Sig: Take 1 Tab by mouth three (3) times daily.  gabapentin (NEURONTIN) 100 mg capsule 90 Cap 1     Sig: Take 1 Cap by mouth three (3) times daily.

## 2019-06-03 RX ORDER — GABAPENTIN 100 MG/1
100 CAPSULE ORAL 3 TIMES DAILY
Qty: 90 CAP | Refills: 1 | Status: SHIPPED | OUTPATIENT
Start: 2019-06-03 | End: 2019-06-25 | Stop reason: DRUGHIGH

## 2019-06-03 RX ORDER — ROPINIROLE 0.25 MG/1
0.25 TABLET, FILM COATED ORAL 3 TIMES DAILY
Qty: 90 TAB | Refills: 1 | Status: SHIPPED | OUTPATIENT
Start: 2019-06-03 | End: 2019-08-09

## 2019-06-11 ENCOUNTER — TELEPHONE (OUTPATIENT)
Dept: FAMILY MEDICINE CLINIC | Age: 74
End: 2019-06-11

## 2019-06-11 NOTE — TELEPHONE ENCOUNTER
[id number] form faxed to office to be completed by provider and faxed back. States please call with status of form.

## 2019-06-13 NOTE — TELEPHONE ENCOUNTER
Called dorota back and left a message for her to refax form over so that we can give it to the provider

## 2019-06-25 ENCOUNTER — OFFICE VISIT (OUTPATIENT)
Dept: FAMILY MEDICINE CLINIC | Age: 74
End: 2019-06-25

## 2019-06-25 VITALS
OXYGEN SATURATION: 97 % | DIASTOLIC BLOOD PRESSURE: 89 MMHG | BODY MASS INDEX: 32.34 KG/M2 | WEIGHT: 244 LBS | TEMPERATURE: 98.6 F | SYSTOLIC BLOOD PRESSURE: 157 MMHG | HEART RATE: 63 BPM | HEIGHT: 73 IN | RESPIRATION RATE: 16 BRPM

## 2019-06-25 DIAGNOSIS — I10 ESSENTIAL HYPERTENSION: ICD-10-CM

## 2019-06-25 DIAGNOSIS — G20 PARKINSON'S DISEASE (HCC): ICD-10-CM

## 2019-06-25 DIAGNOSIS — M54.42 LEFT-SIDED LOW BACK PAIN WITH LEFT-SIDED SCIATICA, UNSPECIFIED CHRONICITY: Primary | ICD-10-CM

## 2019-06-25 DIAGNOSIS — R60.0 LOWER EXTREMITY EDEMA: ICD-10-CM

## 2019-06-25 DIAGNOSIS — E11.9 CONTROLLED TYPE 2 DIABETES MELLITUS WITHOUT COMPLICATION, WITHOUT LONG-TERM CURRENT USE OF INSULIN (HCC): ICD-10-CM

## 2019-06-25 RX ORDER — GABAPENTIN 300 MG/1
300 CAPSULE ORAL 3 TIMES DAILY
Qty: 270 CAP | Refills: 0 | Status: SHIPPED | OUTPATIENT
Start: 2019-06-25 | End: 2019-08-09

## 2019-06-25 RX ORDER — DICLOFENAC SODIUM 10 MG/G
2 GEL TOPICAL 4 TIMES DAILY
Qty: 100 G | Refills: 0 | Status: SHIPPED | OUTPATIENT
Start: 2019-06-25 | End: 2019-10-02 | Stop reason: SDUPTHER

## 2019-06-25 NOTE — PROGRESS NOTES
Chief Complaint   Patient presents with    Pain (Chronic)     legs     1. Have you been to the ER, urgent care clinic since your last visit? Hospitalized since your last visit? No    2. Have you seen or consulted any other health care providers outside of the [address] [place] [address] since your last visit? Include any pap smears or colon screening.  No

## 2019-06-25 NOTE — PROGRESS NOTES
Follow Up Visit Note    Chief Complaint   Patient presents with    Pain (Chronic)     legs       HPI:  Nathan Vila is a 75 y.o.  male  has a past medical history of Diabetes ([facility] [address] Hypertension, and Parkinson's disease ([facility] [address] (2018). is here for the above complaint(s). Low back pain  Patient has low back pain that radiates down his left leg. Pain today is 10/10, pain is worse after sitting for long periods and lying flat. Nothing really makes the pain better. Patient has not been taking anything for the pain. Patient denies any loss of bowel or bladder function. Patient does have paresthesia that radiates down his left leg. Patient states that the pain in back is throbbing and keeps him up at night. Hypertension  Patient is currently taking   Key CAD CHF Meds             lisinopril (PRINIVIL, ZESTRIL) 40 mg tablet (Taking) Take 1 Tab by mouth daily. hydroCHLOROthiazide (HYDRODIURIL) 12.5 mg tablet (Taking) Take 12.5 mg by mouth daily. aspirin delayed-release 81 mg tablet (Taking/Discontinued) Take  by mouth daily. atorvastatin (LIPITOR) 10 mg tablet (Taking) Take  by mouth daily. . BP today is   BP Readings from Last 1 Encounters:   06/25/19 157/89     Patient has been taking medications as prescribed. Patient is not checking BP at home. Patient does follow low salt diet. Patient is not currently exercising. Patient denies chest pain, shortness of breath, palpitations, lower extremity edema, dizziness/lightheadedness or syncopal episodes. Review of Systems   Constitutional: Negative for chills, fever, malaise/fatigue and weight loss. Eyes: Negative for blurred vision, double vision and pain. Respiratory: Negative for cough, sputum production, shortness of breath and wheezing. Cardiovascular: Negative for chest pain, palpitations, orthopnea, claudication and leg swelling.    Gastrointestinal: Negative for abdominal pain, constipation, diarrhea, nausea and vomiting. Genitourinary: Negative for dysuria, frequency and urgency. Musculoskeletal: Positive for back pain. Negative for falls, joint pain, myalgias and neck pain. Neurological: Positive for tremors (h/o parkinson's), speech change and focal weakness. Negative for dizziness, tingling, sensory change, seizures, loss of consciousness, weakness and headaches. Current Outpatient Medications   Medication Sig    gabapentin (NEURONTIN) 300 mg capsule Take 1 Cap by mouth three (3) times daily for 90 days.  diclofenac (VOLTAREN) 1 % gel Apply 2 g to affected area four (4) times daily.  rOPINIRole (REQUIP) 0.25 mg tablet Take 1 Tab by mouth three (3) times daily.  metFORMIN (GLUCOPHAGE) 500 mg tablet Take 1 Tab by mouth two (2) times daily (with meals).  albuterol (PROVENTIL HFA, VENTOLIN HFA, PROAIR HFA) 90 mcg/actuation inhaler Take 2 Puffs by inhalation every four (4) hours as needed for Wheezing.  lisinopril (PRINIVIL, ZESTRIL) 40 mg tablet Take 1 Tab by mouth daily.  aspirin delayed-release 81 mg tablet Take  by mouth daily.  hydroCHLOROthiazide (HYDRODIURIL) 12.5 mg tablet Take 12.5 mg by mouth daily.  atorvastatin (LIPITOR) 10 mg tablet Take  by mouth daily.  glucose blood VI test strips (ASCENSIA AUTODISC VI, ONE TOUCH ULTRA TEST VI) strip For blood glucose testing twice a day.  Blood-Glucose Meter monitoring kit For blood glucose testing twice a day.  Blood Pressure Test Kit-Large kit 1 Kit by Does Not Apply route daily.  lancets misc For blood glucose testing twice a day. No current facility-administered medications for this visit.       Health Maintenance   Topic Date Due    FOOT EXAM Q1  12/04/1955    DTaP/Tdap/Td series (1 - Tdap) 12/04/1965    Shingrix Vaccine Age 50> (1 of 2) 12/04/1994    FOBT Q 1 YEAR AGE 50-75  12/04/1994    Pneumococcal 65+ years (1 of 2 - PCV13) 12/04/2009    MEDICARE YEARLY EXAM  03/26/2019    Influenza Age 5 to Adult 08/01/2019    HEMOGLOBIN A1C Q6M  10/09/2019    MICROALBUMIN Q1  04/09/2020    LIPID PANEL Q1  04/09/2020    GLAUCOMA SCREENING Q2Y  05/10/2021    EYE EXAM RETINAL OR DILATED  05/10/2021       There is no immunization history on file for this patient. Allergies and Medications: Reviewed and updated in EMR. Past Medical History:   Diagnosis Date    Diabetes ([facility] [address]     Hypertension     Parkinson's disease ([facility] 2018       Surgical History: Reviewed and updated in EMR as appropriate. Social History: Reviewed and updated in EMR as appropriate. Family History: Reviewed and updated in EMR as appropriate. OBJECTIVE:   Visit Vitals  /89   Pulse 63   Temp 98.6 °F (37 °C) (Oral)   Resp 16   Ht 6' 1\" (1.854 m)   Wt 244 lb (110.7 kg)   SpO2 97%   BMI 32.19 kg/m²        Physical Exam   Constitutional: He is oriented to person, place, and time and well-developed, well-nourished, and in no distress. No distress. Neck: Normal range of motion. Neck supple. No thyromegaly present. Cardiovascular: Normal rate, regular rhythm, normal heart sounds and intact distal pulses. Exam reveals no gallop and no friction rub. No murmur heard. Pulmonary/Chest: Effort normal and breath sounds normal. No respiratory distress. He has no wheezes. He has no rales. He exhibits no tenderness. Musculoskeletal:        Lumbar back: He exhibits decreased range of motion, tenderness, bony tenderness, pain and spasm. He exhibits no swelling, no edema, no deformity, no laceration and normal pulse. Lymphadenopathy:     He has no cervical adenopathy. Neurological: He is alert and oriented to person, place, and time. Skin: Skin is warm and dry. He is not diaphoretic. Psychiatric: Mood, memory, affect and judgment normal.   Vitals reviewed.       LABS/RADIOLOGICAL TESTS:  Lab Results   Component Value Date/Time    WBC 8.6 02/24/2018 01:55 PM    HGB 14.4 02/24/2018 01:55 PM    HCT 43.4 02/24/2018 01:55 PM    PLATELET 046 02/24/2018 01:55 PM     Lab Results   Component Value Date/Time    Sodium 141 02/24/2018 01:55 PM    Potassium 3.7 02/24/2018 01:55 PM    Chloride 104 02/24/2018 01:55 PM    CO2 29 02/24/2018 01:55 PM    Glucose 187 (H) 02/24/2018 01:55 PM    BUN 9 02/24/2018 01:55 PM    Creatinine 0.89 02/24/2018 01:55 PM     No results found for: CHOL, CHOLX, CHLST, CHOLV, HDL, LDL, LDLC, DLDLP, TGLX, TRIGL, TRIGP  No results found for: GPT  No results found for: HBA1C, HGBE8, QPE7ISBT, HOQ4ZVMO      All lab results and radiological studies were reviewed and discussed with the patient. ASSESSMENT/PLAN:    Diagnoses and all orders for this visit:  Diagnoses and all orders for this visit:    1. Left-sided low back pain with left-sided sciatica, unspecified chronicity  -     gabapentin (NEURONTIN) 300 mg capsule; Take 1 Cap by mouth three (3) times daily for 90 days. -     diclofenac (VOLTAREN) 1 % gel; Apply 2 g to affected area four (4) times daily. -     XR SPINE LUMB 2 OR 3 V; Future - will call with results  Consider PT vs. Ortho. 2. Lower extremity edema  -     AMB SUPPLY ORDER- compression stockings    3. Essential hypertension  Continue with   Key CAD CHF Meds             lisinopril (PRINIVIL, ZESTRIL) 40 mg tablet (Taking) Take 1 Tab by mouth daily. hydroCHLOROthiazide (HYDRODIURIL) 12.5 mg tablet (Taking) Take 12.5 mg by mouth daily. aspirin delayed-release 81 mg tablet (Taking/Discontinued) Take  by mouth daily. atorvastatin (LIPITOR) 10 mg tablet (Taking) Take  by mouth daily. 1) Goal blood pressure less than equal to 140/90 mmHg, goal BP can vary depending on risk factors as discussed. 2) Lifestyle modifications discussed with patient, low sodium <2 gm, low salt , DASH diet  3) Exercise for at least 30 min 3-5 times a week for goal BMI of less than or equal  To 25.  4) Continue current medications as prescribed.   5) Please begin medication as discussed for better blood pressure control, explained side effects and patient verbalized understanding. 6) Goal LDL<100.  7) Please monitor your blood pressure and keep a log to bring in with you at each visit. 8) Discussed risk factors with patient such as CAD, FAST of stroke symptoms, pt verbalizes understanding. 9) Please avoid smoking , alcohol and any illicit drug use if applicable to you. 10) Discussed lifestyle modifications ,dietary control and BP monitoring at home as patient does not wish to begin an antihypertension agent at present. 4. Parkinson's disease ([facility] [address]    - AMB SUPPLY ORDER- Shower chair    5. Controlled type 2 diabetes mellitus without complication, without long-term current use of insulin (HCC)  Continue with   Key Antihyperglycemic Medications             metFORMIN (GLUCOPHAGE) 500 mg tablet (Taking) Take 1 Tab by mouth two (2) times daily (with meals). ICD-10-CM ICD-9-CM    1. Left-sided low back pain with left-sided sciatica, unspecified chronicity M54.42 724.3 gabapentin (NEURONTIN) 300 mg capsule      diclofenac (VOLTAREN) 1 % gel      XR SPINE LUMB 2 OR 3 V   2. Lower extremity edema R60.0 782.3 AMB SUPPLY ORDER       Requested Prescriptions     Signed Prescriptions Disp Refills    gabapentin (NEURONTIN) 300 mg capsule 270 Cap 0     Sig: Take 1 Cap by mouth three (3) times daily for 90 days.  diclofenac (VOLTAREN) 1 % gel 100 g 0     Sig: Apply 2 g to affected area four (4) times daily. Patient verbalized understanding and agreement with the plan. Patient was given an after-visit summary. Follow-up in 2 weeks or sooner if worsening symptoms. More than 50% of this 25 min visit was spent counseling the patient face to face about etiology and treatment of health conditions outlined in assessment and plan        Gertrude Pascual PA-C  [address], [address] [place] [address], [address] [place] [address] [phone]

## 2019-06-28 RX ORDER — ASPIRIN 81 MG/1
81 TABLET ORAL DAILY
Qty: 90 TAB | Refills: 0 | Status: SHIPPED | OUTPATIENT
Start: 2019-06-28 | End: 2019-09-26

## 2019-06-28 NOTE — TELEPHONE ENCOUNTER
Requested Prescriptions     Pending Prescriptions Disp Refills    aspirin delayed-release 81 mg tablet       Sig: Take  by mouth daily.

## 2019-07-01 ENCOUNTER — TELEPHONE (OUTPATIENT)
Dept: FAMILY MEDICINE CLINIC | Age: 74
End: 2019-07-01

## 2019-07-01 NOTE — TELEPHONE ENCOUNTER
Ms Svitlana Fraction called to find out status on Rx for asprin, shower chair and compression socks.

## 2019-07-02 ENCOUNTER — TELEPHONE (OUTPATIENT)
Dept: FAMILY MEDICINE CLINIC | Age: 74
End: 2019-07-02

## 2019-07-02 ENCOUNTER — HOSPITAL ENCOUNTER (OUTPATIENT)
Dept: PHYSICAL THERAPY | Age: 74
Discharge: HOME | End: 2019-07-02
Payer: MEDICARE

## 2019-07-02 PROCEDURE — 97165 OT EVAL LOW COMPLEX 30 MIN: CPT

## 2019-07-02 NOTE — PROGRESS NOTES
OCCUPATIONAL THERAPY - DAILY TREATMENT NOTE    Patient Name: Alice Szymanski        Date: 2019  : 1945   YES Patient  Verified  Visit #:     Insurance: Payor: Lu Moustapha / Plan: [place] & [place] / Product Type: Medicare /      In time: 9:05 Out time: 9:55   Total Treatment Time: 50     Medicare/BCBS Time Tracking (below)   Total Timed Codes (min):  na 1:1 Treatment Time:  na     TREATMENT AREA =  Right hand/ADLs    SUBJECTIVE    Pain Level (on 0 to 10 scale):  4  / 10   Medication Changes/New allergies or changes in medical history, any new surgeries or procedures? NO    If yes, update Summary List   Subjective Functional Status/Changes:  []  No changes reported     I can dress myself and tie my shoes. OBJECTIVE     min Therapeutic Exercise:  [x]  See flow sheet        min Patient Education:  NO  Reviewed HEP   []  Progressed/Changed HEP based on: Other Objective/Functional Measures:    See initial evaluation for details      Post Treatment Pain Level (on 0 to 10) scale:   4  / 10     ASSESSMENT  Assessment/Changes in Function:     Patient presents with pain right hand affecting ADLs and should benefit from skilled OT to address deficits. OT Eval Complexity Justification:  Patient History: Low- Parkinsons  Examination : Low- strength; ADLs; pain   Clinical Decision Making: Low- desires Ind        [x]  See Progress Note/Recertification   Patient will continue to benefit from skilled OT services to address strength deficits and ADLs/fine motor to attain remaining goals.    Progress toward goals / Updated goals:    See eval goals      PLAN  [x]  Upgrade activities as tolerated YES Continue plan of care   []  Discharge due to :    [x]  Other: OT 1-2 x week x 4 weeks for goals      Therapist: THAO Negrete    Date: 2019 Time: 10:29 AM

## 2019-07-02 NOTE — PROGRESS NOTES
[address] PHYSICAL THERAPY  [address] [place] [address] [place], [address] - Phone: [phone]  Fax: [phone] / 563 [facility]  Patient Name: Bren Stuart : 1945   Medical   Diagnosis: Bilateral hand pain [F68.842, M79.642] Treatment Diagnosis: Impaired ADLs; pain right hand   Onset Date:      Referral Source: Love Akers PA-C; [facility] Son[facility] 2019   Prior Hospitalization: See medical history Provider #: [id number]   Prior Level of Function: Ind    Comorbidities: Arthritis; Parkinsons    Medications: Verified on Patient Summary List   The Plan of Care and following information is based on the information from the initial evaluation.   ===========================================================================================  Assessment / key information: Patient is a 74 yo right handed male with Parkinsons, right hand pain and impaired fine motor skills. AROM both arms is WFL. Right SH 3+/5; left SH 4/5. Both elbows, wrists 4/5. Right  43# left 46#  Right pinch 11-15# lft 12-16#. Sensation intact to light touch. Pain 4/10 right hand. No hand edema. ADLs- mod A with shower, needs shower seat. Set up for dressing. Reports difficulty with some fine motor tasks. Requested PT order for gait and balance. Patient has a CG daily 7-3pm to help with meals and personal care. FOTO score: na  ===========================================================================================  Eval Complexity: History: LOW Complexity : Brief history review ; Examination: LOW Complexity : 1-3 performance deficits relating to physical, cognitive , or psychosocial skils that result in activity limitations and / or participation restrictions ;  Decision Making:LOW Complexity : No comorbidities that affect functional and no verbal or physical assistance needed to complete eval tasks Problem List: Pain effecting function, Decreased strength and Decreased ADL/functional abilities    Treatment Plan may include any combination of the following: Therapeutic exercise, Therapeutic activities, Physical agent/modality, Patient education and ADLs/IADLs  Patient / Family readiness to learn indicated by: asking questions, trying to perform skills and interest  Persons(s) to be included in education: patient (P) and family support person (FSP);list CG- Jovon  Barriers to Learning/Limitations: None  Measures taken: na   Patient Goal (s): Stop pain    Patient self reported health status: good  Rehabilitation Potential: good   Short Term Goals: To be accomplished in 3 weeks: 1. Ind HEP to maximize rehab potential  2. Shower chair for bathing safety and Ind   3. Ind fine motor skills to open small packages  4. Decrease pain right hand 2/10 with clinic modalities   Long Term Goals: To be accomplished in 4  weeks:  1. Ind ADLs with adaptive equipment  2. Increase right  5-8# for ADLs/ tasks  3. Right SH 4/5 for overhead reach   Frequency / Duration:   Patient to be seen 1-2  times per week for 4  weeks:  Patient / Caregiver education and instruction: self care    Therapist Signature: THAO Haskins Date: 9/1/3798   Certification Period: 7/2/19-10/1/19 Time: 10:29 AM   ===========================================================================================  I certify that the above Occupational Therapy Services are being furnished while the patient is under my care. I agree with the treatment plan and certify that this therapy is necessary. Physician Signature:        Date:       Time:     Please sign and return to [facility] or you may fax the signed copy to [phone]. Thank you.

## 2019-07-02 NOTE — TELEPHONE ENCOUNTER
Rafi Nolasco occupational PT with [facility] states will be faxing an order to be completed by Malathi for evaluation of gait and balance. States please fax back once completed. Rafi Nolasco also states was told by pt's caregiver Bevhazel Brad pt's shower seat could by delivered to pt's home due to does not cost enough to ship per facility. States per caregiver told facility to cancel the order. States pt is still having a hard time trying to bath due to really need the shower seat. Rafi loredo please call or reorder the shower chair and will  seat for pt. Rafi loredo can be contacted on tues and thurs. States will be in office today until 530.

## 2019-07-05 NOTE — TELEPHONE ENCOUNTER
The aspirin prescription was sent to [facility] on [address] on 6/28. There are 2 supply orders in the chart for compression stockings and shower chair. Please find out where these need to be send and fax them over. Thank you. Gertrude Pascual PA-C  [facility]. [phone]  [address]

## 2019-07-08 NOTE — TELEPHONE ENCOUNTER
Orders for shower chair and compression stockings faxed to [facility] at the request of patient's caretaker.

## 2019-07-10 ENCOUNTER — TELEPHONE (OUTPATIENT)
Dept: FAMILY MEDICINE CLINIC | Age: 74
End: 2019-07-10

## 2019-07-10 DIAGNOSIS — M47.816 SPONDYLOSIS OF LUMBAR SPINE: Primary | ICD-10-CM

## 2019-07-10 DIAGNOSIS — M43.16 SPONDYLOLISTHESIS OF LUMBAR REGION: ICD-10-CM

## 2019-07-10 NOTE — TELEPHONE ENCOUNTER
Please let patient (or caregiver) know that he definitely has arthritic changes in his back and this is the cause of his pain. Would patient like referral to ortho for this or PT? Please let me know how he would like to proceed. Thank you. Gertrude Pascual PA-C  [facility]  [phone]  [address]

## 2019-07-11 NOTE — TELEPHONE ENCOUNTER
I have not seen this order from PT. Can we please have them fax again for completion? Thankyou. Gertrude Pascual PA-C  [facility]. [phone]  [address]

## 2019-07-12 NOTE — TELEPHONE ENCOUNTER
Spoke to Kika Sahni, patients caretaker, and informed her of providers message. States they would like a referral to physical therapy. Routed to provider for referral order.

## 2019-07-30 ENCOUNTER — APPOINTMENT (OUTPATIENT)
Dept: PHYSICAL THERAPY | Age: 74
End: 2019-07-30
Payer: MEDICARE

## 2019-08-09 DIAGNOSIS — M54.42 LEFT-SIDED LOW BACK PAIN WITH LEFT-SIDED SCIATICA, UNSPECIFIED CHRONICITY: ICD-10-CM

## 2019-08-09 RX ORDER — ROPINIROLE 0.25 MG/1
0.25 TABLET, FILM COATED ORAL 3 TIMES DAILY
Qty: 90 TAB | Refills: 1 | Status: SHIPPED | OUTPATIENT
Start: 2019-08-09 | End: 2020-02-16

## 2019-08-12 ENCOUNTER — TELEPHONE (OUTPATIENT)
Dept: FAMILY MEDICINE CLINIC | Age: 74
End: 2019-08-12

## 2019-08-12 RX ORDER — GABAPENTIN 300 MG/1
300 CAPSULE ORAL 3 TIMES DAILY
Qty: 90 CAP | Refills: 0 | Status: SHIPPED | OUTPATIENT
Start: 2019-08-12 | End: 2019-08-28 | Stop reason: SDUPTHER

## 2019-08-12 NOTE — TELEPHONE ENCOUNTER
Left message with caretaker to inform them that RX is available to  at our office for gabapentin. States they will pick it up tomorrow.

## 2019-08-28 DIAGNOSIS — M54.42 LEFT-SIDED LOW BACK PAIN WITH LEFT-SIDED SCIATICA, UNSPECIFIED CHRONICITY: ICD-10-CM

## 2019-08-28 NOTE — TELEPHONE ENCOUNTER
Pharmacist called and requested new Rx for Gabapentin and refill on Lisinopril. Requested Prescriptions     Pending Prescriptions Disp Refills    gabapentin (NEURONTIN) 300 mg capsule 90 Cap 0     Sig: Take 1 Cap by mouth three (3) times daily for 30 days. Max Daily Amount: 900 mg.    lisinopril (PRINIVIL, ZESTRIL) 40 mg tablet 90 Tab 1     Sig: Take 1 Tab by mouth daily.

## 2019-09-05 RX ORDER — LISINOPRIL 40 MG/1
40 TABLET ORAL DAILY
Qty: 90 TAB | Refills: 1 | Status: SHIPPED | OUTPATIENT
Start: 2019-09-05 | End: 2020-02-23

## 2019-09-05 RX ORDER — GABAPENTIN 300 MG/1
300 CAPSULE ORAL 3 TIMES DAILY
Qty: 90 CAP | Refills: 0 | Status: SHIPPED | OUTPATIENT
Start: 2019-09-05 | End: 2019-10-23 | Stop reason: SDUPTHER

## 2019-09-05 NOTE — TELEPHONE ENCOUNTER
Please let patient know they can  gabapentin prescription as it is a written prescription. Gertrude Pascual PA-C  [facility]  [phone]  [address]

## 2019-10-02 ENCOUNTER — OFFICE VISIT (OUTPATIENT)
Dept: FAMILY MEDICINE CLINIC | Age: 74
End: 2019-10-02

## 2019-10-02 VITALS
BODY MASS INDEX: 29.42 KG/M2 | OXYGEN SATURATION: 97 % | SYSTOLIC BLOOD PRESSURE: 144 MMHG | RESPIRATION RATE: 16 BRPM | DIASTOLIC BLOOD PRESSURE: 88 MMHG | HEART RATE: 87 BPM | HEIGHT: 73 IN | WEIGHT: 222 LBS | TEMPERATURE: 98.3 F

## 2019-10-02 DIAGNOSIS — G20 PARKINSON'S DISEASE (HCC): ICD-10-CM

## 2019-10-02 DIAGNOSIS — M54.42 LEFT-SIDED LOW BACK PAIN WITH LEFT-SIDED SCIATICA, UNSPECIFIED CHRONICITY: ICD-10-CM

## 2019-10-02 DIAGNOSIS — R42 DIZZINESS: Primary | ICD-10-CM

## 2019-10-02 DIAGNOSIS — E11.9 CONTROLLED TYPE 2 DIABETES MELLITUS WITHOUT COMPLICATION, WITHOUT LONG-TERM CURRENT USE OF INSULIN (HCC): ICD-10-CM

## 2019-10-02 RX ORDER — ATORVASTATIN CALCIUM 10 MG/1
10 TABLET, FILM COATED ORAL DAILY
Qty: 90 TAB | Refills: 1 | Status: SHIPPED | OUTPATIENT
Start: 2019-10-02 | End: 2020-02-23

## 2019-10-02 RX ORDER — DICLOFENAC SODIUM 10 MG/G
2 GEL TOPICAL 4 TIMES DAILY
Qty: 100 G | Refills: 0 | Status: SHIPPED | OUTPATIENT
Start: 2019-10-02 | End: 2020-02-16

## 2019-10-02 NOTE — PROGRESS NOTES
Chief Complaint Patient presents with  
[facility] Follow Up Rylan Dizziness  Referral Follow Up Needs referral for Neurology-Henrik 1. Have you been to the ER, urgent care clinic since your last visit? Hospitalized since your last visit? Rylan Dizziness 2. Have you seen or consulted any other health care providers outside of the [address] [place] [address] since your last visit? Include any pap smears or colon screening.  No

## 2019-10-02 NOTE — PROGRESS NOTES
Internal Medicine Transition of Care Note/Hospital or Rehab Follow up [address] at [address], [facility], [address], [address] Phone [phone]; Fax [phone] Date of Service:  10/02/2019 Patient's Name and : Gio Venegas 1945 Assessment/Plan: Gio Venegas is a 75 y.o. male seen today for follow up after ER visit for dizziness. Patient went to the emergency department on 2019. Patient states that he was having dizziness for about 1 week, so caregiver decided to take the patient to the emergency department. ED note states: 
 ED Course/Medical Decision Making:  
Well appearing Vague dizziness for at least one week. Caregiver states him walking into walls occasionally is definitively not new and has been ongoing since dx of parkinsons She will get pill box to help pt remember the meds ED w/u as below With sx x at least one week and reassuring exam, do not suspect an acute CVA. He does however have risk factors and an admission for further w/u was offered, pt declined. He is alert and oriented and does not wish to stay. States he does not feel bad and would like to go home instead. At this time, I do not see an indication to hold him if he wishes to leave and again, his w/u has been reassuring thus far He will f/u with PCP Return precautions were given Patient was offered hospital admission given his high risk for stroke, cardiopulmonary process. The patient declined admission. Labs were completed and were within normal limits. EKG, chest x-ray and CT of the head without contrast was also completed. All were within normal limits. CT of the head results: 
CT HEAD W/O CONTRAST Final Result 1. No CT evidence for acute intracranial process.  
-Please note that CT is relatively insensitive for acute ischemia in the first 24-48 hours, and MRI may be helpful if clinically indicated. Guevara Peña 2. Moderate white matter hypoattenuation, presumed chronic microvascular ischemic changes. Chronic-appearing lacunar type infarcts at the left basal ganglia, central leonard, and bilateral cerebellum 3. Moderate cerebral atrophy. 4. Small left frontal extra-axial mass, not significantly changed when compared to prior exam of 3/2017, perhaps meningioma. Patient does present here with some continued dizziness. He states that he feels like this is improved, but he does have occasional periods of dizziness. Patient unable to clarify when exactly he feels dizzy. He states that it is just intermittent, occurring approximately 1-2 times a day. He denies any other associated symptoms to include  Headache, vision changes, chest pain at rest or with exertion, rapid/irregular heart rate, shortness of breath at rest or with exertion, cough, abdominal pain, leg swelling, leg pain. Patient also here for diabetic foot exam.  Patient is trying to get diabetic shoes and needs a foot exam in order for this to be covered by his insurance. . No orders of the defined types were placed in this encounter. BP Readings from Last 3 Encounters:  
10/02/19 144/88  
06/25/19 157/89  
05/21/19 142/80 There are no Patient Instructions on file for this visit. History:   
  
 
 
Patient was not contacted by office to arrange appointment. Hospital notes, testing results and discharge summary reviewed and briefly summarized below. patient discharged  to home without home health services. Patient complains of ongoing intermittent dizziness, otherwise the remainder of the review of systems is negative. There are no active problems to display for this patient. Diabetic Foot exam: 2+ dorsalis pedis pulses bilaterally. Positive monofilament in all 10 toes and bottoms of both feet. Vibratory sense intact bilateral MTP joints. Joint poisition sense intact bilateral first digits. Skin negative for ulcerative/plaque lesions, signs of infection, or other visual foot abnormalities. Positive onychomycosis. Diabetic foot exam:  
 
Left Foot: 
 Visual Exam: callous - heel, arch, great toe Pulse DP: 2+ (normal) Filament test: 1/6 Vibratory sensation: not performed Right Foot: 
 Visual Exam: callous - heel, arch Pulse DP: 2+ (normal) Filament test: absent sensation Vibratory sensation: not performed Objective:    
/88   Pulse 87   Temp 98.3 °F (36.8 °C) (Oral)   Resp 16   Ht 6' 1\" (1.854 m)   Wt 222 lb (100.7 kg)   SpO2 97%   BMI 29.29 kg/m² Physical Exam  
Constitutional: He is oriented to person, place, and time and well-developed, well-nourished, and in no distress. No distress. Neck: Normal range of motion. Neck supple. No thyromegaly present. Cardiovascular: Normal rate, regular rhythm, normal heart sounds and intact distal pulses. Exam reveals no gallop and no friction rub. No murmur heard. Pulmonary/Chest: Effort normal and breath sounds normal. No respiratory distress. He has no wheezes. He has no rales. He exhibits no tenderness. Lymphadenopathy:  
  He has no cervical adenopathy. Neurological: He is alert and oriented to person, place, and time. He has normal strength and intact cranial nerves. He is not agitated. He displays no atrophy and normal speech. No sensory deficit. Skin: Skin is warm and dry. He is not diaphoretic. Psychiatric: Mood, memory, affect and judgment normal.  
Nursing note and vitals reviewed. Medications:  
 
Current Outpatient Medications Medication Sig  
 gabapentin (NEURONTIN) 300 mg capsule Take 1 Cap by mouth three (3) times daily for 30 days. Max Daily Amount: 900 mg.  lisinopril (PRINIVIL, ZESTRIL) 40 mg tablet Take 1 Tab by mouth daily.  rOPINIRole (REQUIP) 0.25 mg tablet Take 1 Tab by mouth three (3) times daily.  diclofenac (VOLTAREN) 1 % gel Apply 2 g to affected area four (4) times daily.  metFORMIN (GLUCOPHAGE) 500 mg tablet Take 1 Tab by mouth two (2) times daily (with meals).  albuterol (PROVENTIL HFA, VENTOLIN HFA, PROAIR HFA) 90 mcg/actuation inhaler Take 2 Puffs by inhalation every four (4) hours as needed for Wheezing.  glucose blood VI test strips (ASCENSIA AUTODISC VI, ONE TOUCH ULTRA TEST VI) strip For blood glucose testing twice a day.  hydroCHLOROthiazide (HYDRODIURIL) 12.5 mg tablet Take 12.5 mg by mouth daily.  Blood-Glucose Meter monitoring kit For blood glucose testing twice a day.  Blood Pressure Test Kit-Large kit 1 Kit by Does Not Apply route daily.  lancets misc For blood glucose testing twice a day.  atorvastatin (LIPITOR) 10 mg tablet Take  by mouth daily. No current facility-administered medications for this visit. Additional History History reviewed. No pertinent surgical history. Social History Socioeconomic History  Marital status: UNKNOWN Spouse name: Not on file  Number of children: Not on file  Years of education: Not on file  Highest education level: Not on file Tobacco Use  Smoking status: Never Smoker  Smokeless tobacco: Never Used Substance and Sexual Activity  Alcohol use: Never Frequency: Never  Drug use: Never Social History Narrative ** Merged History Encounter ** No family history on file. No Known Allergies Encounter Diagnoses:  
Diagnoses and all orders for this visit: 1. Dizziness 
-     REFERRAL TO NEUROLOGY Referral to neurology was placed in the past, however patient did not receive a call about this appointment. Will request a referral again to neurology secondary to dizziness as well as Parkinson's disease. 2. Parkinson's disease ([facility] 
-     REFERRAL TO NEUROLOGY 3. Left-sided low back pain with left-sided sciatica, unspecified chronicity Ongoing, chronic condition. Patient is requesting a refill of Voltaren today. -   RF diclofenac (VOLTAREN) 1 % gel; Apply 2 g to affected area four (4) times daily. 4. Controlled type 2 diabetes mellitus without complication, without long-term current use of insulin ([facility] -     METABOLIC PANEL, COMPREHENSIVE; Future 
-     HEMOGLOBIN A1C WITH EAG; Future Discussed with patient and caregiver that I am concerned that this could be hypoglycemic episodes. We have recently lowered his metformin due to a drop in overall glucose levels. Patient's diet has significantly improved since caregiver has taken over for his meals. Check A1c to determine further management of diabetes and whether this improves the dizziness. Other orders -Refill  atorvastatin (LIPITOR) 10 mg tablet; Take 1 Tab by mouth daily. Follow-up in 1 month. This document may have been created with the aid of dictation software. Text may contain errors, particularly phonetic errors. .A total of 25 minutes were spent face-to-face with the patient during this encounter and over half of that time was spent on counseling and coordination of care. We discussed in depth the importance of FALL PRECAUTIONS I also educated the patient about DIABETIC FOOT CARE.

## 2019-10-08 ENCOUNTER — HOSPITAL ENCOUNTER (OUTPATIENT)
Dept: LAB | Age: 74
Discharge: HOME | End: 2019-10-08
Payer: MEDICARE

## 2019-10-08 DIAGNOSIS — E11.9 CONTROLLED TYPE 2 DIABETES MELLITUS WITHOUT COMPLICATION, WITHOUT LONG-TERM CURRENT USE OF INSULIN (HCC): ICD-10-CM

## 2019-10-08 LAB
ALBUMIN SERPL-MCNC: 3.8 G/DL (ref 3.4–5)
ALBUMIN/GLOB SERPL: 1 {RATIO} (ref 0.8–1.7)
ALP SERPL-CCNC: 90 U/L (ref 45–117)
ALT SERPL-CCNC: 15 U/L (ref 16–61)
ANION GAP SERPL CALC-SCNC: 4 MMOL/L (ref 3–18)
AST SERPL-CCNC: 8 U/L (ref 10–38)
BILIRUB SERPL-MCNC: 0.9 MG/DL (ref 0.2–1)
BUN SERPL-MCNC: 8 MG/DL (ref 7–18)
BUN/CREAT SERPL: 10 (ref 12–20)
CALCIUM SERPL-MCNC: 9.6 MG/DL (ref 8.5–10.1)
CHLORIDE SERPL-SCNC: 108 MMOL/L (ref 100–111)
CO2 SERPL-SCNC: 31 MMOL/L (ref 21–32)
CREAT SERPL-MCNC: 0.83 MG/DL (ref 0.6–1.3)
EST. AVERAGE GLUCOSE BLD GHB EST-MCNC: NORMAL MG/DL
GLOBULIN SER CALC-MCNC: 4 G/DL (ref 2–4)
GLUCOSE SERPL-MCNC: 72 MG/DL (ref 74–99)
HBA1C MFR BLD: 4.7 % (ref 4.2–5.6)
POTASSIUM SERPL-SCNC: 3.8 MMOL/L (ref 3.5–5.5)
PROT SERPL-MCNC: 7.8 G/DL (ref 6.4–8.2)
SODIUM SERPL-SCNC: 143 MMOL/L (ref 136–145)

## 2019-10-08 PROCEDURE — 80053 COMPREHEN METABOLIC PANEL: CPT

## 2019-10-08 PROCEDURE — 83036 HEMOGLOBIN GLYCOSYLATED A1C: CPT

## 2019-10-08 PROCEDURE — 36415 COLL VENOUS BLD VENIPUNCTURE: CPT

## 2019-10-09 ENCOUNTER — TELEPHONE (OUTPATIENT)
Dept: FAMILY MEDICINE CLINIC | Age: 74
End: 2019-10-09

## 2019-10-09 NOTE — TELEPHONE ENCOUNTER
Called patient and after verification of name and , discussed results. Explained to patient's caregiver that his blood sugars are likely going to low and this could be the cause of his dizziness. His a1c is 4.7. I am recommending that he discontinue metformin at this time. Will recheck his a1c in 3-4 months. Patient caregiver verbalized understanding. Gertrude Pascual PA-C  [facility]  [phone]  [facility], [address] [place] [address]

## 2019-10-23 DIAGNOSIS — M54.42 LEFT-SIDED LOW BACK PAIN WITH LEFT-SIDED SCIATICA, UNSPECIFIED CHRONICITY: ICD-10-CM

## 2019-10-23 DIAGNOSIS — E11.9 CONTROLLED TYPE 2 DIABETES MELLITUS WITHOUT COMPLICATION, WITHOUT LONG-TERM CURRENT USE OF INSULIN (HCC): ICD-10-CM

## 2019-10-23 DIAGNOSIS — R05.9 COUGH: ICD-10-CM

## 2019-10-23 NOTE — TELEPHONE ENCOUNTER
Per fax,  Also requesting One touch Ultra 2 kit & test strips. Requested Prescriptions     Pending Prescriptions Disp Refills    albuterol (PROVENTIL HFA, VENTOLIN HFA, PROAIR HFA) 90 mcg/actuation inhaler 1 Inhaler 1     Sig: Take 2 Puffs by inhalation every four (4) hours as needed for Wheezing.  Blood-Glucose Meter monitoring kit 1 Kit 0     Sig: For blood glucose testing twice a day.  gabapentin (NEURONTIN) 300 mg capsule 90 Cap 0     Sig: Take 1 Cap by mouth three (3) times daily for 30 days. Max Daily Amount: 900 mg.

## 2019-10-23 NOTE — TELEPHONE ENCOUNTER
Requested Prescriptions     Pending Prescriptions Disp Refills    albuterol (PROVENTIL HFA, VENTOLIN HFA, PROAIR HFA) 90 mcg/actuation inhaler 1 Inhaler 1     Sig: Take 2 Puffs by inhalation every four (4) hours as needed for Wheezing.  Blood-Glucose Meter monitoring kit 1 Kit 0     Sig: For blood glucose testing twice a day.  gabapentin (NEURONTIN) 300 mg capsule 90 Cap 0     Sig: Take 1 Cap by mouth three (3) times daily for 30 days. Max Daily Amount: 900 mg.    metFORMIN (GLUCOPHAGE) 500 mg tablet 60 Tab 2     Sig: Take 1 Tab by mouth two (2) times daily (with meals).

## 2019-10-29 RX ORDER — ALBUTEROL SULFATE 90 UG/1
2 AEROSOL, METERED RESPIRATORY (INHALATION)
Qty: 1 INHALER | Refills: 1 | Status: SHIPPED | OUTPATIENT
Start: 2019-10-29

## 2019-10-29 RX ORDER — INSULIN PUMP SYRINGE, 3 ML
EACH MISCELLANEOUS
Qty: 1 KIT | Refills: 0 | Status: SHIPPED | OUTPATIENT
Start: 2019-10-29 | End: 2020-02-23

## 2019-10-29 RX ORDER — GABAPENTIN 300 MG/1
300 CAPSULE ORAL 3 TIMES DAILY
Qty: 90 CAP | Refills: 0 | Status: SHIPPED | OUTPATIENT
Start: 2019-10-29 | End: 2019-11-28

## 2019-10-29 RX ORDER — METFORMIN HYDROCHLORIDE 500 MG/1
500 TABLET ORAL 2 TIMES DAILY WITH MEALS
Qty: 60 TAB | Refills: 2 | OUTPATIENT
Start: 2019-10-29

## 2019-10-30 DIAGNOSIS — R05.9 COUGH: ICD-10-CM

## 2019-10-30 DIAGNOSIS — E11.9 CONTROLLED TYPE 2 DIABETES MELLITUS WITHOUT COMPLICATION, WITHOUT LONG-TERM CURRENT USE OF INSULIN (HCC): ICD-10-CM

## 2019-10-30 NOTE — TELEPHONE ENCOUNTER
10/28/19  Per fax    Requested Prescriptions     Pending Prescriptions Disp Refills    metFORMIN (GLUCOPHAGE) 500 mg tablet 60 Tab 2     Sig: Take 1 Tab by mouth two (2) times daily (with meals).  albuterol (PROVENTIL HFA, VENTOLIN HFA, PROAIR HFA) 90 mcg/actuation inhaler 1 Inhaler 1     Sig: Take 2 Puffs by inhalation every four (4) hours as needed for Wheezing.

## 2019-10-31 RX ORDER — ALBUTEROL SULFATE 90 UG/1
2 AEROSOL, METERED RESPIRATORY (INHALATION)
Qty: 1 INHALER | Refills: 1 | OUTPATIENT
Start: 2019-10-31

## 2019-10-31 RX ORDER — METFORMIN HYDROCHLORIDE 500 MG/1
500 TABLET ORAL 2 TIMES DAILY WITH MEALS
Qty: 60 TAB | Refills: 2 | OUTPATIENT
Start: 2019-10-31

## 2019-11-05 DIAGNOSIS — E11.9 CONTROLLED TYPE 2 DIABETES MELLITUS WITHOUT COMPLICATION, WITHOUT LONG-TERM CURRENT USE OF INSULIN (HCC): ICD-10-CM

## 2019-11-05 RX ORDER — METFORMIN HYDROCHLORIDE 500 MG/1
500 TABLET ORAL 2 TIMES DAILY WITH MEALS
Qty: 60 TAB | Refills: 2 | OUTPATIENT
Start: 2019-11-05

## 2019-11-05 NOTE — TELEPHONE ENCOUNTER
Per fax,    Requested Prescriptions     Pending Prescriptions Disp Refills    metFORMIN (GLUCOPHAGE) 500 mg tablet 60 Tab 2     Sig: Take 1 Tab by mouth two (2) times daily (with meals).

## 2020-01-20 ENCOUNTER — HOSPITAL ENCOUNTER (OUTPATIENT)
Dept: LAB | Age: 75
Discharge: HOME | End: 2020-01-20

## 2020-01-20 LAB
ALBUMIN SERPL-MCNC: 2.8 G/DL (ref 3.4–5)
ALBUMIN/GLOB SERPL: 0.6 {RATIO} (ref 0.8–1.7)
ALP SERPL-CCNC: 91 U/L (ref 45–117)
ALT SERPL-CCNC: 35 U/L (ref 16–61)
ANION GAP SERPL CALC-SCNC: 5 MMOL/L (ref 3–18)
AST SERPL-CCNC: 20 U/L (ref 10–38)
BASOPHILS # BLD: 0 K/UL (ref 0–0.1)
BASOPHILS NFR BLD: 0 % (ref 0–2)
BILIRUB SERPL-MCNC: 0.9 MG/DL (ref 0.2–1)
BUN SERPL-MCNC: 15 MG/DL (ref 7–18)
BUN/CREAT SERPL: 21 (ref 12–20)
CALCIUM SERPL-MCNC: 9.3 MG/DL (ref 8.5–10.1)
CHLORIDE SERPL-SCNC: 98 MMOL/L (ref 100–111)
CHOLEST SERPL-MCNC: 97 MG/DL
CO2 SERPL-SCNC: 30 MMOL/L (ref 21–32)
CREAT SERPL-MCNC: 0.73 MG/DL (ref 0.6–1.3)
DIFFERENTIAL METHOD BLD: ABNORMAL
EOSINOPHIL # BLD: 0.2 K/UL (ref 0–0.4)
EOSINOPHIL NFR BLD: 1 % (ref 0–5)
ERYTHROCYTE [DISTWIDTH] IN BLOOD BY AUTOMATED COUNT: 13.4 % (ref 11.6–14.5)
GLOBULIN SER CALC-MCNC: 4.7 G/DL (ref 2–4)
GLUCOSE SERPL-MCNC: 126 MG/DL (ref 74–99)
HCT VFR BLD AUTO: 36.1 % (ref 36–48)
HDLC SERPL-MCNC: 41 MG/DL (ref 40–60)
HDLC SERPL: 2.4 {RATIO} (ref 0–5)
HGB BLD-MCNC: 12.2 G/DL (ref 13–16)
LDLC SERPL CALC-MCNC: 45 MG/DL (ref 0–100)
LIPID PROFILE,FLP: NORMAL
LYMPHOCYTES # BLD: 4.4 K/UL (ref 0.9–3.6)
LYMPHOCYTES NFR BLD: 29 % (ref 21–52)
MAGNESIUM SERPL-MCNC: 1.9 MG/DL (ref 1.6–2.6)
MCH RBC QN AUTO: 31.6 PG (ref 24–34)
MCHC RBC AUTO-ENTMCNC: 33.8 G/DL (ref 31–37)
MCV RBC AUTO: 93.5 FL (ref 74–97)
MONOCYTES # BLD: 1.3 K/UL (ref 0.05–1.2)
MONOCYTES NFR BLD: 9 % (ref 3–10)
NEUTS SEG # BLD: 9 K/UL (ref 1.8–8)
NEUTS SEG NFR BLD: 61 % (ref 40–73)
PLATELET # BLD AUTO: 300 K/UL (ref 135–420)
PMV BLD AUTO: 10.7 FL (ref 9.2–11.8)
POTASSIUM SERPL-SCNC: 4.4 MMOL/L (ref 3.5–5.5)
PROT SERPL-MCNC: 7.5 G/DL (ref 6.4–8.2)
RBC # BLD AUTO: 3.86 M/UL (ref 4.7–5.5)
SODIUM SERPL-SCNC: 133 MMOL/L (ref 136–145)
TRIGL SERPL-MCNC: 55 MG/DL (ref ?–150)
VLDLC SERPL CALC-MCNC: 11 MG/DL
WBC # BLD AUTO: 14.9 K/UL (ref 4.6–13.2)

## 2020-01-20 PROCEDURE — 83735 ASSAY OF MAGNESIUM: CPT

## 2020-01-20 PROCEDURE — 80061 LIPID PANEL: CPT

## 2020-01-20 PROCEDURE — 85025 COMPLETE CBC W/AUTO DIFF WBC: CPT

## 2020-01-20 PROCEDURE — 80053 COMPREHEN METABOLIC PANEL: CPT

## 2020-01-20 PROCEDURE — 36415 COLL VENOUS BLD VENIPUNCTURE: CPT

## 2020-02-16 PROBLEM — A41.9 SEPSIS (HCC): Status: ACTIVE | Noted: 2020-02-16

## 2020-02-16 PROBLEM — J18.9 MULTIFOCAL PNEUMONIA: Status: ACTIVE | Noted: 2020-02-16

## 2020-02-16 PROBLEM — N17.9 ACUTE RENAL FAILURE (ARF) (HCC): Status: ACTIVE | Noted: 2020-02-16

## 2020-02-16 PROBLEM — R65.21 SEVERE SEPSIS WITH SEPTIC SHOCK (CODE) (HCC): Status: ACTIVE | Noted: 2020-02-16

## 2020-02-16 PROBLEM — N39.0 URINARY TRACT INFECTION WITH HEMATURIA: Status: ACTIVE | Noted: 2020-02-16

## 2020-02-16 PROBLEM — N39.0 URINARY TRACT INFECTION: Status: ACTIVE | Noted: 2020-02-16

## 2020-02-16 PROBLEM — E87.20 LACTIC ACIDOSIS: Status: ACTIVE | Noted: 2020-02-16

## 2020-02-16 PROBLEM — G93.40 ENCEPHALOPATHY: Status: ACTIVE | Noted: 2020-02-16

## 2020-02-16 PROBLEM — J18.9 PNEUMONIA: Status: ACTIVE | Noted: 2020-02-16

## 2020-02-16 PROBLEM — R31.9 URINARY TRACT INFECTION WITH HEMATURIA: Status: ACTIVE | Noted: 2020-02-16

## 2021-01-01 ENCOUNTER — HOSPITAL ENCOUNTER (OUTPATIENT)
Dept: LAB | Age: 76
Discharge: HOME | End: 2021-03-05

## 2021-01-01 LAB
ANION GAP SERPL CALC-SCNC: 5 MMOL/L (ref 3–18)
BASOPHILS # BLD: 0 K/UL (ref 0–0.1)
BASOPHILS NFR BLD: 0 % (ref 0–2)
BUN SERPL-MCNC: 19 MG/DL (ref 7–18)
BUN/CREAT SERPL: 20 (ref 12–20)
CALCIUM SERPL-MCNC: 9.4 MG/DL (ref 8.5–10.1)
CHLORIDE SERPL-SCNC: 99 MMOL/L (ref 100–111)
CO2 SERPL-SCNC: 32 MMOL/L (ref 21–32)
CREAT SERPL-MCNC: 0.93 MG/DL (ref 0.6–1.3)
DIFFERENTIAL METHOD BLD: ABNORMAL
EOSINOPHIL # BLD: 0.2 K/UL (ref 0–0.4)
EOSINOPHIL NFR BLD: 2 % (ref 0–5)
ERYTHROCYTE [DISTWIDTH] IN BLOOD BY AUTOMATED COUNT: 13.1 % (ref 11.6–14.5)
GLUCOSE SERPL-MCNC: 226 MG/DL (ref 74–99)
HCT VFR BLD AUTO: 37.7 % (ref 36–48)
HGB BLD-MCNC: 11.4 G/DL (ref 13–16)
LYMPHOCYTES # BLD: 5.9 K/UL (ref 0.9–3.6)
LYMPHOCYTES NFR BLD: 43 % (ref 21–52)
MCH RBC QN AUTO: 30.8 PG (ref 24–34)
MCHC RBC AUTO-ENTMCNC: 30.2 G/DL (ref 31–37)
MCV RBC AUTO: 101.9 FL (ref 74–97)
MONOCYTES # BLD: 0.8 K/UL (ref 0.05–1.2)
MONOCYTES NFR BLD: 6 % (ref 3–10)
NEUTS SEG # BLD: 6.9 K/UL (ref 1.8–8)
NEUTS SEG NFR BLD: 49 % (ref 40–73)
PLATELET # BLD AUTO: 287 K/UL (ref 135–420)
PMV BLD AUTO: 11 FL (ref 9.2–11.8)
POTASSIUM SERPL-SCNC: 4.9 MMOL/L (ref 3.5–5.5)
RBC # BLD AUTO: 3.7 M/UL (ref 4.7–5.5)
SODIUM SERPL-SCNC: 136 MMOL/L (ref 136–145)
WBC # BLD AUTO: 13.8 K/UL (ref 4.6–13.2)

## 2021-01-01 PROCEDURE — 85025 COMPLETE CBC W/AUTO DIFF WBC: CPT

## 2021-01-01 PROCEDURE — 80048 BASIC METABOLIC PNL TOTAL CA: CPT

## 2021-08-03 PROBLEM — J18.9 PNEUMONIA: Status: RESOLVED | Noted: 2020-02-16 | Resolved: 2021-01-01

## 2021-08-15 PROBLEM — T68.XXXA HYPOTHERMIA: Status: ACTIVE | Noted: 2021-01-01

## 2021-08-15 PROBLEM — J69.0 ASPIRATION PNEUMONIA (HCC): Status: ACTIVE | Noted: 2021-01-01
